# Patient Record
Sex: MALE | Race: ASIAN | NOT HISPANIC OR LATINO | URBAN - METROPOLITAN AREA
[De-identification: names, ages, dates, MRNs, and addresses within clinical notes are randomized per-mention and may not be internally consistent; named-entity substitution may affect disease eponyms.]

---

## 2021-02-12 ENCOUNTER — INPATIENT (INPATIENT)
Facility: HOSPITAL | Age: 21
LOS: 4 days | Discharge: HOME | End: 2021-02-17
Attending: PSYCHIATRY & NEUROLOGY | Admitting: PSYCHIATRY & NEUROLOGY
Payer: COMMERCIAL

## 2021-02-12 VITALS
RESPIRATION RATE: 18 BRPM | SYSTOLIC BLOOD PRESSURE: 143 MMHG | DIASTOLIC BLOOD PRESSURE: 72 MMHG | HEART RATE: 93 BPM | OXYGEN SATURATION: 100 % | TEMPERATURE: 98 F

## 2021-02-12 DIAGNOSIS — F33.2 MAJOR DEPRESSIVE DISORDER, RECURRENT SEVERE WITHOUT PSYCHOTIC FEATURES: ICD-10-CM

## 2021-02-12 DIAGNOSIS — F32.1 MAJOR DEPRESSIVE DISORDER, SINGLE EPISODE, MODERATE: ICD-10-CM

## 2021-02-12 LAB
ALBUMIN SERPL ELPH-MCNC: 4.6 G/DL — SIGNIFICANT CHANGE UP (ref 3.3–5)
ALP SERPL-CCNC: 81 U/L — SIGNIFICANT CHANGE UP (ref 40–120)
ALT FLD-CCNC: 15 U/L — SIGNIFICANT CHANGE UP (ref 4–41)
ANION GAP SERPL CALC-SCNC: 14 MMOL/L — SIGNIFICANT CHANGE UP (ref 7–14)
APPEARANCE UR: CLEAR — SIGNIFICANT CHANGE UP
AST SERPL-CCNC: 23 U/L — SIGNIFICANT CHANGE UP (ref 4–40)
B PERT DNA SPEC QL NAA+PROBE: SIGNIFICANT CHANGE UP
BASOPHILS # BLD AUTO: 0.03 K/UL — SIGNIFICANT CHANGE UP (ref 0–0.2)
BASOPHILS NFR BLD AUTO: 0.4 % — SIGNIFICANT CHANGE UP (ref 0–2)
BILIRUB SERPL-MCNC: 0.5 MG/DL — SIGNIFICANT CHANGE UP (ref 0.2–1.2)
BILIRUB UR-MCNC: NEGATIVE — SIGNIFICANT CHANGE UP
BUN SERPL-MCNC: 11 MG/DL — SIGNIFICANT CHANGE UP (ref 7–23)
C PNEUM DNA SPEC QL NAA+PROBE: SIGNIFICANT CHANGE UP
CALCIUM SERPL-MCNC: 9.8 MG/DL — SIGNIFICANT CHANGE UP (ref 8.4–10.5)
CHLORIDE SERPL-SCNC: 100 MMOL/L — SIGNIFICANT CHANGE UP (ref 98–107)
CO2 SERPL-SCNC: 22 MMOL/L — SIGNIFICANT CHANGE UP (ref 22–31)
COLOR SPEC: YELLOW — SIGNIFICANT CHANGE UP
CREAT SERPL-MCNC: 1.11 MG/DL — SIGNIFICANT CHANGE UP (ref 0.5–1.3)
DIFF PNL FLD: NEGATIVE — SIGNIFICANT CHANGE UP
EOSINOPHIL # BLD AUTO: 0.07 K/UL — SIGNIFICANT CHANGE UP (ref 0–0.5)
EOSINOPHIL NFR BLD AUTO: 0.9 % — SIGNIFICANT CHANGE UP (ref 0–6)
FLUAV SUBTYP SPEC NAA+PROBE: SIGNIFICANT CHANGE UP
FLUBV RNA SPEC QL NAA+PROBE: SIGNIFICANT CHANGE UP
GLUCOSE SERPL-MCNC: 72 MG/DL — SIGNIFICANT CHANGE UP (ref 70–99)
GLUCOSE UR QL: NEGATIVE — SIGNIFICANT CHANGE UP
HADV DNA SPEC QL NAA+PROBE: SIGNIFICANT CHANGE UP
HCOV 229E RNA SPEC QL NAA+PROBE: SIGNIFICANT CHANGE UP
HCOV HKU1 RNA SPEC QL NAA+PROBE: SIGNIFICANT CHANGE UP
HCOV NL63 RNA SPEC QL NAA+PROBE: SIGNIFICANT CHANGE UP
HCOV OC43 RNA SPEC QL NAA+PROBE: SIGNIFICANT CHANGE UP
HCT VFR BLD CALC: 47.7 % — SIGNIFICANT CHANGE UP (ref 39–50)
HGB BLD-MCNC: 15.3 G/DL — SIGNIFICANT CHANGE UP (ref 13–17)
HMPV RNA SPEC QL NAA+PROBE: SIGNIFICANT CHANGE UP
HPIV1 RNA SPEC QL NAA+PROBE: SIGNIFICANT CHANGE UP
HPIV2 RNA SPEC QL NAA+PROBE: SIGNIFICANT CHANGE UP
HPIV3 RNA SPEC QL NAA+PROBE: SIGNIFICANT CHANGE UP
HPIV4 RNA SPEC QL NAA+PROBE: SIGNIFICANT CHANGE UP
IANC: 4.97 K/UL — SIGNIFICANT CHANGE UP (ref 1.5–8.5)
IMM GRANULOCYTES NFR BLD AUTO: 0.4 % — SIGNIFICANT CHANGE UP (ref 0–1.5)
KETONES UR-MCNC: ABNORMAL
LEUKOCYTE ESTERASE UR-ACNC: NEGATIVE — SIGNIFICANT CHANGE UP
LYMPHOCYTES # BLD AUTO: 2.38 K/UL — SIGNIFICANT CHANGE UP (ref 1–3.3)
LYMPHOCYTES # BLD AUTO: 29.8 % — SIGNIFICANT CHANGE UP (ref 13–44)
MCHC RBC-ENTMCNC: 28.2 PG — SIGNIFICANT CHANGE UP (ref 27–34)
MCHC RBC-ENTMCNC: 32.1 GM/DL — SIGNIFICANT CHANGE UP (ref 32–36)
MCV RBC AUTO: 87.8 FL — SIGNIFICANT CHANGE UP (ref 80–100)
MONOCYTES # BLD AUTO: 0.51 K/UL — SIGNIFICANT CHANGE UP (ref 0–0.9)
MONOCYTES NFR BLD AUTO: 6.4 % — SIGNIFICANT CHANGE UP (ref 2–14)
NEUTROPHILS # BLD AUTO: 4.97 K/UL — SIGNIFICANT CHANGE UP (ref 1.8–7.4)
NEUTROPHILS NFR BLD AUTO: 62.1 % — SIGNIFICANT CHANGE UP (ref 43–77)
NITRITE UR-MCNC: NEGATIVE — SIGNIFICANT CHANGE UP
NRBC # BLD: 0 /100 WBCS — SIGNIFICANT CHANGE UP
NRBC # FLD: 0 K/UL — SIGNIFICANT CHANGE UP
PCP SPEC-MCNC: SIGNIFICANT CHANGE UP
PH UR: 6 — SIGNIFICANT CHANGE UP (ref 5–8)
PLATELET # BLD AUTO: 290 K/UL — SIGNIFICANT CHANGE UP (ref 150–400)
POTASSIUM SERPL-MCNC: 4.4 MMOL/L — SIGNIFICANT CHANGE UP (ref 3.5–5.3)
POTASSIUM SERPL-SCNC: 4.4 MMOL/L — SIGNIFICANT CHANGE UP (ref 3.5–5.3)
PROT SERPL-MCNC: 7.4 G/DL — SIGNIFICANT CHANGE UP (ref 6–8.3)
PROT UR-MCNC: ABNORMAL
RAPID RVP RESULT: SIGNIFICANT CHANGE UP
RBC # BLD: 5.43 M/UL — SIGNIFICANT CHANGE UP (ref 4.2–5.8)
RBC # FLD: 12.2 % — SIGNIFICANT CHANGE UP (ref 10.3–14.5)
RSV RNA SPEC QL NAA+PROBE: SIGNIFICANT CHANGE UP
RV+EV RNA SPEC QL NAA+PROBE: SIGNIFICANT CHANGE UP
SARS-COV-2 RNA SPEC QL NAA+PROBE: SIGNIFICANT CHANGE UP
SODIUM SERPL-SCNC: 136 MMOL/L — SIGNIFICANT CHANGE UP (ref 135–145)
SP GR SPEC: 1.02 — SIGNIFICANT CHANGE UP (ref 1.01–1.02)
TOXICOLOGY SCREEN, DRUGS OF ABUSE, SERUM RESULT: SIGNIFICANT CHANGE UP
TSH SERPL-MCNC: 1.67 UIU/ML — SIGNIFICANT CHANGE UP (ref 0.27–4.2)
UROBILINOGEN FLD QL: SIGNIFICANT CHANGE UP
WBC # BLD: 7.99 K/UL — SIGNIFICANT CHANGE UP (ref 3.8–10.5)
WBC # FLD AUTO: 7.99 K/UL — SIGNIFICANT CHANGE UP (ref 3.8–10.5)

## 2021-02-12 PROCEDURE — 99285 EMERGENCY DEPT VISIT HI MDM: CPT

## 2021-02-12 RX ORDER — ESCITALOPRAM OXALATE 10 MG/1
5 TABLET, FILM COATED ORAL DAILY
Refills: 0 | Status: DISCONTINUED | OUTPATIENT
Start: 2021-02-12 | End: 2021-02-17

## 2021-02-12 RX ADMIN — Medication 1 MILLIGRAM(S): at 22:52

## 2021-02-12 NOTE — ED BEHAVIORAL HEALTH NOTE - BEHAVIORAL HEALTH NOTE
Writer called [phone] spoke to pt's father Zhane Joseph.  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  Pt last worked one week ago.  He states he brought pt to the ER because pt was upset.  He states pt gets angry very fast then he wants to do things to himself.  He states in the past (about one year ago) pt used to talk about suicide.  He states today pt said he was fed up with life. He states he never brought patient to the emergency room because he didn't think it was serious.  He states now pt seems to be in an angry mood when he asks pt to go to the grocery store.  He states in the past patient used to run errands with him.  Pt's father doesn't know if patient should go to the hospital at this time, he thinks pt should just get treatment for one night and then go home.  He states patient is having a hard time sleeping, pt is up at 2 and 3 am.  Pt's room is in the attic and states he can hear patient walking around.  He believes pt is eating normally.  He states one month ago pt punched a wall, but nothing dangerous.  Denies pt uses any drugs or alcohol. Writer called [phone] spoke to pt's father Zhane Joseph.  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  Pt last worked one week ago.  He states he brought pt to the ER because pt was upset.  He states pt gets angry very fast then he wants to do things to himself.  He states in the past (about one year ago) pt used to talk about suicide.  He states today pt said he was fed up with life. He states he never brought patient to the emergency room because he didn't think it was serious.  He states now pt seems to be in an angry mood when he asks pt to go to the grocery store, pt is more withdrawn.  He states in the past patient used to run errands with him.  Pt's father doesn't know if patient should go to the hospital at this time, he thinks pt should just get treatment for one night and then go home.  He states patient is having a hard time sleeping, pt is up at 2 and 3 am.  Pt's room is in the attic and states he can hear patient walking around.  He believes pt is eating normally.  He states one month ago pt punched a wall, but nothing dangerous.  Denies pt uses any drugs or alcohol.

## 2021-02-12 NOTE — ED PROVIDER NOTE - OBJECTIVE STATEMENT
21 y/o M BIB family   secondary to depression and  concerns of suicidal   ideations.   Admits to  inability to cope and requesting medication.   Denies HI/AH/VH.  Denies falling, punching or kicking any objects. Denies recent use of alcohol or illicit drugs.  No evidence of physical injuries, broken  skin or deformities.  Denies pain, SOB, fever, chills, chest/abdominal discomfort.

## 2021-02-12 NOTE — ED BEHAVIORAL HEALTH ASSESSMENT NOTE - DESCRIPTION
parents are , lives with father, rest of family is in [place]. Student at [employer] and employed at [employer] in a [employer] denies calm, cooperative  Vital Signs Last 24 Hrs  T(C): 36.8 (12 Feb 2021 13:17), Max: 36.8 (12 Feb 2021 13:17)  T(F): 98.2 (12 Feb 2021 13:17), Max: 98.2 (12 Feb 2021 13:17)  HR: 93 (12 Feb 2021 13:17) (93 - 93)  BP: 143/72 (12 Feb 2021 13:17) (143/72 - 143/72)  BP(mean): --  RR: 18 (12 Feb 2021 13:17) (18 - 18)  SpO2: 100% (12 Feb 2021 13:17) (100% - 100%)

## 2021-02-12 NOTE — ED BEHAVIORAL HEALTH ASSESSMENT NOTE - PSYCHIATRIC ISSUES AND PLAN (INCLUDE STANDING AND PRN MEDICATION)
Puree food was mixed with blue fruit dye for contrast./puree
honey thick/puree/Puree food and honey thick liquids were mixed with blue fruit dye for contrast.
Pt offered mechanical soft solids, pureed foods and honey thick/nectar thick liquids as well as thin liquids.
Will start Lexapro 5mg QAM. Ativan 1mg PO q6 and 2mg IM PRN for anxiety/agitation
Yes

## 2021-02-12 NOTE — ED BEHAVIORAL HEALTH NOTE - BEHAVIORAL HEALTH NOTE
COVID Exposure Screen- Patient     1.        *Have you had a COVID-19 test in the last 21 days?  (  ) Yes   ( x ) No   (  ) Unknown- Reason: ______  IF YES PROCEED TO QUESTION #2. IF NO OR UNKNOWN THEN PLEASE SKIP TO QUESTION #3.  2.        Date of test: ________  3.        3. Do you know the result? (  ) Negative   (  ) Positive   (  ) No result available  4.        *In the past 14 days, have you been around anyone with a positive COVID-19 test?*  (  ) Yes   ( x ) No   (  ) Unknown- Reason (e.g. patient uncertain, sedated, refusing to answer, etc.):  ______  IF YES PROCEED TO QUESTION #5. IF NO or UNKNOWN, PLEASE SKIP TO QUESTION #10  5.        Were you within 6 feet of them for at least 15 minutes? (  ) Yes   (  ) No   (  ) Unknown- Reason: _____  6.        Have you provided care for them? (  ) Yes   (  ) No   (  ) Unknown- Reason: ______  7.        Have you had direct physical contact with them (touched, hugged, or kissed them)? (  ) Yes   (  ) No    (  ) Unknown- Reason: ___  8.        Have you shared eating or drinking utensils with them? (  ) Yes   (  ) No    (  ) Unknown- Reason: ____  9.        Have they sneezed, coughed, or somehow got respiratory droplets on you? (  ) Yes   (  ) No    (  ) Unknown- Reason: ______  10.     *Have you been out of [place] within the past 14 days?*  (  ) Yes   ( x ) No   (  ) Unknown- Reason (e.g. patient uncertain, sedated, refusing to answer, etc.): _______  IF YES PLEASE ANSWER THE FOLLOWING QUESTIONS:  11.     Which state/country have you been to? ______  12.     Were you there over 24 hours? (  ) Yes   (  ) No    (  ) Unknown- Reason: ______  13.     Date of return to [facility]: ______

## 2021-02-12 NOTE — ED ADULT TRIAGE NOTE - CHIEF COMPLAINT QUOTE
Pt co feeling depressed x 2 years not on any medications has had SI thoughts over the past few years but states over last few days those thoughts of SI are worse. Pt states a friend betrayed him causing those thoughts to arise.

## 2021-02-12 NOTE — ED PROVIDER NOTE - IV ALTEPASE ADMIN HIDDEN
Opened chart to review prior to surgery. Checks made to ensure chart/patient is cleared and ready for surgery. show

## 2021-02-12 NOTE — ED PROVIDER NOTE - CLINICAL SUMMARY MEDICAL DECISION MAKING FREE TEXT BOX
Labs, Urine Tox/UA, EKG    Medical evaluation performed. There is no clinical evidence of intoxication or any acute medical problem requiring immediate intervention. Patient is awaiting psychiatric consultation. Final disposition will be determined by psychiatrist. 21 y/o M   Labs, Urine Tox/UA, EKG    Medical evaluation performed. There is no clinical evidence of intoxication or any acute medical problem requiring immediate intervention. Patient is awaiting psychiatric consultation. Final disposition will be determined by psychiatrist.

## 2021-02-12 NOTE — ED BEHAVIORAL HEALTH ASSESSMENT NOTE - RISK ASSESSMENT
Moderate Acute Suicide Risk acute risks include depressive symptoms, passive and active suicidal thinking with vague plans, recent loss of friendship, insomnia, lack of treatment. no chronic risks identified. protective factors include no reported suicidal intent, no prior suicide attempts, no history of physical violence, no known access to weapons, no legal issues, stable domicile, working, in school. Risk is moderately elevated, will mitigate with psychiatric admission, medication management and millieu therapy.

## 2021-02-12 NOTE — ED BEHAVIORAL HEALTH ASSESSMENT NOTE - NSACTIVEVENT_PSY_ALL_CORE
Triggering events leading to humiliation, shame, and/or despair (e.g., Loss of relationship, financial or health status) (real or anticipated)/Recent onset of psychiatric illness

## 2021-02-12 NOTE — ED BEHAVIORAL HEALTH NOTE - BEHAVIORAL HEALTH NOTE
[facility] for precert at [phone]. Writer spoke with representative who reported pt birthday in [employer] system as July 8th 2000. Our system listed as 2000. Writer then transferred to SANAZ VARMA who took clinical information. Patricia reported notice of admission registered for 11 units to [facility] starting 2/12 to 2/23 with reference #[id number]. Concurrent review due 2/22 with contact to be assigned. D/c summary to be called in as well.     Writer contacted pt’s father, Zhane Joseph, at [phone]. Father informed of pt’s pending admission to unit Low 6 at [facility]. Father asked to confirm birthday originally reported 2000 but then changing to 2000 when told of insurance switch. Father did not feel confident with confirmation reports being under stress.

## 2021-02-12 NOTE — ED ADULT NURSE NOTE - OBJECTIVE STATEMENT
Pt arrived to  endorsing S/I with no plan. Pt calm and cooperative, denies H/I A/H v/H. Pt changed into  clothing. Personal property collected and logged.

## 2021-02-12 NOTE — ED BEHAVIORAL HEALTH ASSESSMENT NOTE - SUMMARY
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   Patient endorses symptoms consistent with a major depressive episode. He has been considering suicide via various means though reports no intent. He is oddly related which could stem from cultural differences, though his description of a "restart" in terms of death does not appear to be entirely consistent with his cultural or Jehovah's witness beliefs, which would warrant further exploration for psychotic features. He is requesting admission for safety and medication management and based on this evaluation is appropriate for inpatient level of care. Patient has capacity for voluntary legal status. 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   Patient endorses symptoms consistent with a major depressive episode. He has been considering suicide via various means though reports no intent. He is oddly related which could stem from cultural differences, though his description of a "restart" in terms of death does not appear to be entirely consistent with his cultural or Taoism beliefs, which would warrant further exploration for psychotic features. He is requesting admission for safety and medication management and based on this evaluation is appropriate for inpatient level of care. Patient has capacity for voluntary legal status. Given no intent for self harm or prior self harm, and help seeking behavior without evidence of agitation in the ED, I would not recommend 1:1 in a locked supervised setting.

## 2021-02-12 NOTE — ED BEHAVIORAL HEALTH ASSESSMENT NOTE - HPI (INCLUDE ILLNESS QUALITY, SEVERITY, DURATION, TIMING, CONTEXT, MODIFYING FACTORS, ASSOCIATED SIGNS AND SYMPTOMS)
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   Pt states he has been depressed for the past 2-3 years but has "bottled it up". He states he has always managed by reaching out to friends. He noticed over the past several days it worsened after the end of a friendship with a female, he describes her as his "confidant" but now she is no longer in his life. He states he has contemplated suicide many times but noted the thoughts increasing in intensity since this falling out with his friend. He states he often thinks he will be better off dead, because he might get "a restart" but since he is not sure he will get a restart, he has not gone through with killing himself. He states "its easy" when asked about a suicide method, stating he could cut wrists, jump off a building, saying "I watch tv, I know how to do it" but denies coming up with a concrete plan, denies researching methods of suicide, denies procuring objects to harm himself with. Denies access to a gun.  He states he "feels nothing" and is "empty". He is not motivated or interested in school, saying virtual school has been challenging for him and as a result he is currently on academic probation. He denies concentration difficulties. He states his sleep is chronically poor (he often works the night shift but occasionally takes an evening shift) and he is tired. He has difficulty falling asleep even after working an overnight shift. He reports no change in appetite. He feels guilt for this issue with his relationship. Has also had self injurious thoughts but denies ever engaging in behaviors. He denies homicidal ideation. He denies paranoia, denies hallucinations, denies IOR. Denies ever experiencing manic symptoms. He reports anxiety and tenseness but denies panic attacks. He states in the past he experimented with hallucinogens (shrooms and lsd) after researching its potential therapeutic benefits on depression. He states he smokes weed occasionally and drinks socially. He states he likes being drunk but rarely gets to that point, last drink was last night, denies blackouts/withdrawal. He denies ever being physically violent but states he punched a wall once. He is requesting admission.    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   Pt states he has been depressed for the past 2-3 years but has "bottled it up". He states he has always managed by reaching out to friends. He noticed over the past several days it worsened after the end of a friendship with a female, he describes her as his "confidant" but now she is no longer in his life. He states he has contemplated suicide many times but noted the thoughts increasing in intensity since this falling out with his friend. He states he often thinks he will be better off dead, because he might get "a restart", but since he is not sure he will get a restart, he has not gone through with killing himself. He states "it's easy" when asked about a suicide method, stating he could cut wrists, jump off a building, saying "I watch tv, I know how to do it" but denies coming up with a concrete plan, denies researching methods of suicide, denies procuring objects to harm himself with or taking other preparatory actions. Denies access to a gun.  He states he "feels nothing" and is "empty". He is not motivated or interested in school, saying virtual school has been challenging for him and as a result he is currently on academic probation. He denies concentration difficulties. He states his sleep is chronically poor (he often works the night shift but occasionally takes an evening shift) and he is tired. He has difficulty falling asleep even after working an overnight shift. He reports no change in appetite. He feels guilt for this issue with his relationship. Has also had self injurious thoughts but denies ever engaging in behaviors. He denies homicidal ideation. He denies paranoia, denies hallucinations, denies IOR. Denies ever experiencing manic symptoms. He reports anxiety and tenseness but denies panic attacks. He states in the past he experimented with hallucinogens (shrooms and lsd) after researching its potential therapeutic benefits on depression. He states he smokes weed occasionally and drinks socially. He states he likes being drunk but rarely gets to that point, last drink was last night, denies blackouts/withdrawal. He denies ever being physically violent but states he punched a wall once. He is requesting admission.    See  note for information from father

## 2021-02-12 NOTE — ED BEHAVIORAL HEALTH ASSESSMENT NOTE - DETAILS
self referred, father informed passive and active SI with multiple vague plans but no intent punched a wall once MARTI

## 2021-02-13 DIAGNOSIS — F34.1 DYSTHYMIC DISORDER: ICD-10-CM

## 2021-02-13 PROCEDURE — 99222 1ST HOSP IP/OBS MODERATE 55: CPT

## 2021-02-13 RX ORDER — MIRTAZAPINE 45 MG/1
7.5 TABLET, ORALLY DISINTEGRATING ORAL AT BEDTIME
Refills: 0 | Status: DISCONTINUED | OUTPATIENT
Start: 2021-02-13 | End: 2021-02-17

## 2021-02-13 RX ORDER — ACETAMINOPHEN 500 MG
650 TABLET ORAL EVERY 6 HOURS
Refills: 0 | Status: DISCONTINUED | OUTPATIENT
Start: 2021-02-13 | End: 2021-02-17

## 2021-02-13 RX ADMIN — ESCITALOPRAM OXALATE 5 MILLIGRAM(S): 10 TABLET, FILM COATED ORAL at 09:40

## 2021-02-13 RX ADMIN — MIRTAZAPINE 7.5 MILLIGRAM(S): 45 TABLET, ORALLY DISINTEGRATING ORAL at 20:54

## 2021-02-13 NOTE — BH INPATIENT PSYCHIATRY ASSESSMENT NOTE - NSSUICPROTFACT_PSY_ALL_CORE
Responsibility to children, family, or others/Identifies reasons for living/Fear of death or the actual act of killing self/Cultural, spiritual and/or moral attitudes against suicide/Engaged in work or school

## 2021-02-13 NOTE — BH INPATIENT PSYCHIATRY ASSESSMENT NOTE - HPI (INCLUDE ILLNESS QUALITY, SEVERITY, DURATION, TIMING, CONTEXT, MODIFYING FACTORS, ASSOCIATED SIGNS AND SYMPTOMS)
Patient seen by me at length in conference room for initial inpatient interview.  I have reviewed the admission record and reviewed the patient's physical examination, review of systems and labs. I have discussed the case with the treatment team.       Patient states he has been depressed for years and only felt "normal" after using "Magic mushrooms".  Has been increasingly depressed but recently had a close female friend who ended their friendship and "betrayed" im though he would not elaborate. 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   Pt states he has been depressed for the past 2-3 years but has "bottled it up". He states he has always managed by reaching out to friends. He noticed over the past several days it worsened after the end of a friendship with a female, he describes her as his "confidant" but now she is no longer in his life. He states he has contemplated suicide many times but noted the thoughts increasing in intensity since this falling out with his friend. He states he often thinks he will be better off dead, because he might get "a restart", but since he is not sure he will get a restart, he has not gone through with killing himself. He states "it's easy" when asked about a suicide method, stating he could cut wrists, jump off a building, saying "I watch tv, I know how to do it" but denies coming up with a concrete plan, denies researching methods of suicide, denies procuring objects to harm himself with or taking other preparatory actions. Denies access to a gun.  He states he "feels nothing" and is "empty". He is not motivated or interested in school, saying virtual school has been challenging for him and as a result he is currently on academic probation. He denies concentration difficulties. He states his sleep is chronically poor (he often works the night shift but occasionally takes an evening shift) and he is tired. He has difficulty falling asleep even after working an overnight shift. He reports no change in appetite. He feels guilt for this issue with his relationship. Has also had self injurious thoughts but denies ever engaging in behaviors. He denies homicidal ideation. He denies paranoia, denies hallucinations, denies IOR. Denies ever experiencing manic symptoms. He reports anxiety and tenseness but denies panic attacks. He states in the past he experimented with hallucinogens (shrooms and lsd) after researching its potential therapeutic benefits on depression. He states he smokes weed occasionally and drinks socially. He states he likes being drunk but rarely gets to that point, last drink was last night, denies blackouts/withdrawal. He denies ever being physically violent but states he punched a wall once. He is requesting admission.    See  note for information from father

## 2021-02-13 NOTE — BH INPATIENT PSYCHIATRY ASSESSMENT NOTE - CURRENT MEDICATION
MEDICATIONS  (STANDING):  escitalopram 5 milliGRAM(s) Oral daily    MEDICATIONS  (PRN):  LORazepam     Tablet 1 milliGRAM(s) Oral every 6 hours PRN anxiety  LORazepam     Tablet 2 milliGRAM(s) Oral every 2 hours PRN CIWA score increase by 2 points and current CIWA score GREATER THAN 9  LORazepam   Injectable 2 milliGRAM(s) IntraMuscular once PRN combativeness due to depression

## 2021-02-13 NOTE — BH INPATIENT PSYCHIATRY ASSESSMENT NOTE - MSE UNSTRUCTURED FT
On initial MSE today the patient is casually dressed and makes fair eye contact. Speech is clear and of normal rate. Thought process is linear and goal directed with no disorder of thought process. Thought content with no evidence of delusions, obsessions, ruminations or phobias. Denies hallucinations.  Mood is described as "depressed" affect constricted. Patient with helplessness and some hopelessness and passive SI but with no hx of NSSIB and denies suicidal ideation, intent and plan. Pt denies homicidal and aggressive ideation, intent and plan.  Patient is  Alert and oriented X3. Cognitively grossly intact. Insight and judgment are limited. Impulse control is intact at this time.

## 2021-02-13 NOTE — BH INPATIENT PSYCHIATRY ASSESSMENT NOTE - RISK ASSESSMENT
acute risks include depressive symptoms, passive and active suicidal thinking with vague plans, recent loss of friendship, insomnia, lack of treatment. no chronic risks identified. protective factors include no reported suicidal intent, no prior suicide attempts, no history of physical violence, no known access to weapons, no legal issues, stable domicile, working, in school. Risk is moderately elevated, will mitigate with psychiatric admission, medication management and millieu therapy.

## 2021-02-13 NOTE — BH INPATIENT PSYCHIATRY ASSESSMENT NOTE - NSBHCHARTREVIEWVS_PSY_A_CORE FT
Vital Signs Last 24 Hrs  T(C): 36.3 (13 Feb 2021 06:33), Max: 36.8 (12 Feb 2021 13:17)  T(F): 97.3 (13 Feb 2021 06:33), Max: 98.2 (12 Feb 2021 13:17)  HR: 78 (13 Feb 2021 06:33) (67 - 93)  BP: 132/78 (13 Feb 2021 06:33) (127/80 - 143/72)  BP(mean): --  RR: 18 (13 Feb 2021 06:33) (16 - 18)  SpO2: 100% (12 Feb 2021 17:15) (100% - 100%)

## 2021-02-13 NOTE — BH INPATIENT PSYCHIATRY ASSESSMENT NOTE - NSCORESITESY/N_GEN_A_CORE_RD
Internal Medicine Progress Note    Today's Date:  2019   Patient:  Lizeth Guerra  Patient :  1950    Subjective:     Chief Complaint   Patient presents with    Annual Wellness Visit    Hypertension    Weight Management    Cholesterol Problem    Other     DMV paperwork      Hypertension   This is a chronic problem. BP is at goal. Pt takes lopressor, norvasc and candesartan. Pt reports compliance with these medications. Atrial fibrillation  This is a chronic problem. This is at goal. Pt is on coumadin and a beta blocker. Pt was taken off amiodarone. Pt is seeing Dr. Sammuel Sacks and Dr. Jemma Yanes. Pt is s/p cardiac ablation and cardioversion. Obesity Class III/Hyperlipidemia  This is a chronic problem. This is not at goal. Pt was on the ketogenic/IF diet. She lost weight since the last visit. Lab Results   Component Value Date/Time    Cholesterol, total 211 (H) 2019 10:12 AM    HDL Cholesterol 46 2019 10:12 AM    LDL, calculated 120 (H) 2019 10:12 AM    VLDL, calculated 45 2019 10:12 AM    Triglyceride 225 (H) 2019 10:12 AM    CHOL/HDL Ratio 4.6 2019 10:12 AM     3 most recent PHQ Screens 2019   PHQ Not Done -   Little interest or pleasure in doing things Not at all   Feeling down, depressed, irritable, or hopeless Not at all   Total Score PHQ 2 0     Past Medical History:   Diagnosis Date    Acute bilateral low back pain without sciatica 2018    Advance directive discussed with patient 2016    Pt would like to appoint her son, Annabella Barber as her medical decision maker in the event that she can no longer do so. Phone number is [phone]. Her secondary person is her niece, Sandra Saavedra. Phone number is [phone]. If her death is imminent and medical treatment will not help her recover, pt does not want life prolonging measures.   If her condition makes her unaware of h    Dizziness 5/3/2016    Hypertriglyceridemia 9/6/2016    Mixed hyperlipidemia 8/19/2019    Obesity, Class III, BMI 40-49.9 (morbid obesity) ([facility] [address] 6/29/2015    Osteoarthritis     Shingles 9/29/2015     History reviewed. No pertinent surgical history. reports that she has never smoked. She has never used smokeless tobacco. She reports that she does not drink alcohol or use drugs. Family History   Problem Relation Age of Onset    Heart Disease Father         unknown, ? MI at age 46    Arthritis-osteo Sister      No Known Allergies     Review of Systems   CV:      chest pain, palpitations  PULM:  SOB, wheezing, cough, sputum production    Current Outpatient Meds and Allergies     Current Outpatient Medications on File Prior to Visit   Medication Sig Dispense Refill    metoprolol tartrate (LOPRESSOR) 100 mg IR tablet Take  by mouth two (2) times a day.  warfarin (COUMADIN) 2.5 mg tablet Take 1 Tab by mouth daily. 90 Tab 3    acetaminophen (TYLENOL) 650 mg TbER Take 2 Tabs by mouth every eight (8) hours. 90 Tab 3    pravastatin (PRAVACHOL) 40 mg tablet Take 1 Tab by mouth nightly. 90 Tab 3    candesartan (ATACAND) 32 mg tablet Take 1 Tab by mouth daily. 90 Tab 3    amLODIPine (NORVASC) 5 mg tablet Take 1 Tab by mouth daily. 90 Tab 3    cyclobenzaprine (FLEXERIL) 10 mg tablet Take 1 Tab by mouth three (3) times daily as needed for Muscle Spasm(s). 30 Tab 0     No current facility-administered medications on file prior to visit.       Objective:       Visit Vitals  /80 (BP 1 Location: Left arm, BP Patient Position: Sitting)   Pulse 70   Temp 98 °F (36.7 °C) (Oral)   Resp 16   Ht 5' 1\" (1.549 m)   Wt 257 lb (116.6 kg)   SpO2 98%   BMI 48.56 kg/m²     General:   Well-nourished, well-groomed, pleasant, alert, in no acute distress  Head:  Normocephalic, atraumatic  Ears:  External ears WNL  Nose:  External nares WNL  Psych:  No pressured speech, no abnormal thought content    Office Visit on 08/19/2019   Component Date Value Ref Range Status    Mammography, External 07/12/2019 negative    Final   Hospital Outpatient Visit on 05/28/2019   Component Date Value Ref Range Status    LIPID PROFILE 05/28/2019        Final    Cholesterol, total 05/28/2019 211* <200 MG/DL Final    Triglyceride 05/28/2019 225* <150 MG/DL Final    Comment: The drugs N-acetylcysteine (NAC) and  Metamiszole have been found to cause falsely  low results in this chemical assay. Please  be sure to submit blood samples obtained  BEFORE administration of either of these  drugs to assure correct results.  HDL Cholesterol 05/28/2019 46  40 - 60 MG/DL Final    LDL, calculated 05/28/2019 120* 0 - 100 MG/DL Final    VLDL, calculated 05/28/2019 45  MG/DL Final    CHOL/HDL Ratio 05/28/2019 4.6  0 - 5.0   Final    Hemoglobin A1c 05/28/2019 5.5  4.2 - 5.6 % Final    Comment: (NOTE)  HbA1C Interpretive Ranges  <5.7              Normal  5.7 - 6.4         Consider Prediabetes  >6.5              Consider Diabetes      Est. average glucose 05/28/2019 111  mg/dL Final    Comment: (NOTE)  The eAG should be interpreted with patient characteristics in mind   since ethnicity, interindividual differences, red cell lifespan,   variation in rates of glycation, etc. may affect the validity of the   calculation.       Sodium 05/28/2019 140  136 - 145 mmol/L Final    Potassium 05/28/2019 4.2  3.5 - 5.5 mmol/L Final    Chloride 05/28/2019 105  100 - 108 mmol/L Final    CO2 05/28/2019 28  21 - 32 mmol/L Final    Anion gap 05/28/2019 7  3.0 - 18 mmol/L Final    Glucose 05/28/2019 88  74 - 99 mg/dL Final    BUN 05/28/2019 14  7.0 - 18 MG/DL Final    Creatinine 05/28/2019 0.65  0.6 - 1.3 MG/DL Final    BUN/Creatinine ratio 05/28/2019 22* 12 - 20   Final    GFR est AA 05/28/2019 >60  >60 ml/min/1.73m2 Final    GFR est non-AA 05/28/2019 >60  >60 ml/min/1.73m2 Final    Comment: (NOTE)  Estimated GFR is calculated using the Modification of Diet in Renal   Disease (MDRD) Study equation, reported for both  Americans   (GFRAA) and non- Americans (GFRNA), and normalized to 1.73m2   body surface area. The physician must decide which value applies to   the patient. The MDRD study equation should only be used in   individuals age 25 or older. It has not been validated for the   following: pregnant women, patients with serious comorbid conditions,   or on certain medications, or persons with extremes of body size,   muscle mass, or nutritional status.  Calcium 05/28/2019 8.6  8.5 - 10.1 MG/DL Final     Assessment/Plan & Orders:         ICD-10-CM ICD-9-CM    1. Medicare annual wellness visit, subsequent Z00.00 V70.0    2. Essential hypertension B87 419.5 METABOLIC PANEL, COMPREHENSIVE      LIPID PANEL   3. Mixed hyperlipidemia E78.2 272.2    4. Obesity, Class III, BMI 40-49.9 (morbid obesity) ([facility]) E66.01 278.01    5. Vitamin D deficiency E55.9 268.9 VITAMIN D, 25 HYDROXY   6. Elevated blood sugar R73.9 790.29 HEMOGLOBIN A1C WITH EAG      Diet with exercise  Follow up with cardiology and orthopedics    Follow-up and Dispositions    · Return in about 6 months (around 2/19/2020) for Hypertension, Weight management, Hyperlipidemia, Go over lab/imaging results. *Patient verbalized understanding and agreement with the plan. Patient was given an after-visit summary. Lanie Leyva.  [address], MD - Internal Medicine  8/19/2019, 9:38 AM  [facility]  [address] [facility] [place], [address]  Phone [phone]  Fax [phone] Yes

## 2021-02-13 NOTE — BH INPATIENT PSYCHIATRY ASSESSMENT NOTE - NSCOMMENTSUICRISKFACT_PSY_ALL_CORE
Patient with depressed mood, substance misuse and recent stressors including loss of friendship and academic stress but denies adctive SI and brought self to hospital for help

## 2021-02-13 NOTE — BH INPATIENT PSYCHIATRY ASSESSMENT NOTE - DESCRIPTION
parents are , lives with father, rest of family is in [place]. Student at [employer] and employed at [employer] in a [employer]

## 2021-02-14 PROCEDURE — 99231 SBSQ HOSP IP/OBS SF/LOW 25: CPT

## 2021-02-14 RX ADMIN — MIRTAZAPINE 7.5 MILLIGRAM(S): 45 TABLET, ORALLY DISINTEGRATING ORAL at 20:25

## 2021-02-14 RX ADMIN — ESCITALOPRAM OXALATE 5 MILLIGRAM(S): 10 TABLET, FILM COATED ORAL at 09:45

## 2021-02-14 NOTE — PSYCHIATRIC REHAB INITIAL EVALUATION - NSBHPRRECOMMEND_PSY_ALL_CORE
Writer met with pt to orient them to the unit and the psych rehab department. Pt was able to tolerate session with writer and was pleasant and cooperative. Pt reported he brought himself to the hospital after experiencing an increase in depressive symptoms in the context of a friendship ending. Pt reported that he has been friends with this person for a long time and that he has always been there for her through her depression and bipolar disorder. Pt stated that he has always liked this friend and things became more intimate recently. Pt reported that there was a misunderstanding over text and his friend suggested they no longer communicate. Pt reported that this in addition to the stress of work made him depressed. Pt endorsed insomnia and stated that prior to admission to [facility] he has had a lot of trouble falling asleep. Pt explained that he attends college and is interested [place] transfer to the college track unit 1S. Pt stated that since being admitted he is feeling much better. Pt stated that the antidepressant medications are working and the sleeping aide has helped him sleep more than he has in the a long time. Pt reported that coming to the hospital has allowed him to put things in perspective. Pt reported that while it is still upsetting he lost this friendship he feels that he needs to focus more on himself and not rely on this one friend for support. Pt stated that he has a very close relationship with his mother and father who are  and that he talks with them regularly. Pt reported that he lives with his father. Pt stated that he attends college and that it has been difficult having the classes virtually because of COVID. Pt stated that he enjoys school and wishes to continue. Pt reported that he works at [employer] and that he enjoys his job because it is very physical and is good exercise. Pt stated that he is no longer going to work overnights when that shift is offered. Pt stated that prior to the admission he worked three overnights followed by an evening shift which he now identifies as his limit. 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 Pt reported hx of cannabis, ETOH, LSD, and mushroom use. Pt stated that he only used cannabis with the one friend that he now is no longer speaking with. Pt stated that he enjoys alcohol but doesn't drink often. Pt stated that he hasn't used LSD and mushrooms for a few years. Pt reported that he was previously depressed in high-school and using mushrooms helped cure him. Pt stated that he would rather take an antidepressant than use mushrooms. Pt reported that he will look into this further when he leaves the hospital. Pt denied SI/HI, manic symptoms, AH/VH, and poor appetite. Pt's ADLs are appropriate.

## 2021-02-14 NOTE — PSYCHIATRIC REHAB INITIAL EVALUATION - NSBHPRTOOLBOX_PSY_ALL_CORE
Pt reported playing chess as a coping skill. Pt stated that he enjoys playing chess and plays on the computer at times and would prefer to play with someone in person./Entertainment/Exercise/Family support/Music/Other

## 2021-02-14 NOTE — PSYCHIATRIC REHAB INITIAL EVALUATION - NSBHEDUCURGRADE_PSY_ALL_CORE
Pt reported that he is currently in college and enjoys it. Pt reported that all his classes have been virtual which has been challenging and he has been very stressed due to working overnights at his job. Pt stated that he wishes to continue school and will take less overnight shifts at work so he is not as stressed./4 year college

## 2021-02-14 NOTE — BH SAFETY PLAN - INTERNAL COPING STRATEGY 1
looking at my hospital bracelet to put things in perspective Looking at my hospital bracelet to put things in perspective

## 2021-02-15 PROCEDURE — 99231 SBSQ HOSP IP/OBS SF/LOW 25: CPT

## 2021-02-15 RX ADMIN — Medication 1 MILLIGRAM(S): at 21:17

## 2021-02-15 RX ADMIN — MIRTAZAPINE 7.5 MILLIGRAM(S): 45 TABLET, ORALLY DISINTEGRATING ORAL at 20:47

## 2021-02-15 RX ADMIN — ESCITALOPRAM OXALATE 5 MILLIGRAM(S): 10 TABLET, FILM COATED ORAL at 08:23

## 2021-02-16 PROCEDURE — 99231 SBSQ HOSP IP/OBS SF/LOW 25: CPT

## 2021-02-16 RX ADMIN — ESCITALOPRAM OXALATE 5 MILLIGRAM(S): 10 TABLET, FILM COATED ORAL at 08:22

## 2021-02-16 RX ADMIN — MIRTAZAPINE 7.5 MILLIGRAM(S): 45 TABLET, ORALLY DISINTEGRATING ORAL at 20:33

## 2021-02-16 NOTE — BH INPATIENT PSYCHIATRY PROGRESS NOTE - NSBHATTENDATTEST_PSY_ALL_CORE
I have personally seen, examined and participated in the care of this patient. I have reviewed all pertinent clinical information, including history, physical exam, plan and the Medical/PA/NP Student’s note and agree except as noted.

## 2021-02-17 VITALS — TEMPERATURE: 99 F

## 2021-02-17 PROBLEM — Z78.9 OTHER SPECIFIED HEALTH STATUS: Chronic | Status: ACTIVE | Noted: 2021-02-12

## 2021-02-17 LAB
SARS-COV-2 IGG SERPL IA-ACNC: 0.4 RATIO — SIGNIFICANT CHANGE UP
SARS-COV-2 IGG SERPL QL IA: NEGATIVE — SIGNIFICANT CHANGE UP
SARS-COV-2 IGG SERPL QL IA: NEGATIVE — SIGNIFICANT CHANGE UP
SARS-COV-2 IGM SERPL IA-ACNC: 0.78 RATIO — SIGNIFICANT CHANGE UP

## 2021-02-17 PROCEDURE — 99238 HOSP IP/OBS DSCHRG MGMT 30/<: CPT

## 2021-02-17 RX ORDER — MIRTAZAPINE 45 MG/1
1 TABLET, ORALLY DISINTEGRATING ORAL
Qty: 30 | Refills: 0
Start: 2021-02-17 | End: 2021-03-18

## 2021-02-17 RX ORDER — ESCITALOPRAM OXALATE 10 MG/1
1 TABLET, FILM COATED ORAL
Qty: 30 | Refills: 0
Start: 2021-02-17 | End: 2021-03-18

## 2021-02-17 RX ADMIN — ESCITALOPRAM OXALATE 5 MILLIGRAM(S): 10 TABLET, FILM COATED ORAL at 08:13

## 2021-02-17 NOTE — BH INPATIENT PSYCHIATRY PROGRESS NOTE - CURRENT MEDICATION
MEDICATIONS  (STANDING):  escitalopram 5 milliGRAM(s) Oral daily  mirtazapine 7.5 milliGRAM(s) Oral at bedtime    MEDICATIONS  (PRN):  acetaminophen   Tablet .. 650 milliGRAM(s) Oral every 6 hours PRN Moderate Pain (4 - 6), Severe Pain (7 - 10)  LORazepam     Tablet 1 milliGRAM(s) Oral every 6 hours PRN anxiety  LORazepam     Tablet 2 milliGRAM(s) Oral every 2 hours PRN CIWA score increase by 2 points and current CIWA score GREATER THAN 9  LORazepam   Injectable 2 milliGRAM(s) IntraMuscular once PRN combativeness due to depression  
MEDICATIONS  (STANDING):  escitalopram 5 milliGRAM(s) Oral daily  mirtazapine 7.5 milliGRAM(s) Oral at bedtime    MEDICATIONS  (PRN):  acetaminophen   Tablet .. 650 milliGRAM(s) Oral every 6 hours PRN Moderate Pain (4 - 6), Severe Pain (7 - 10)  LORazepam     Tablet 2 milliGRAM(s) Oral every 2 hours PRN CIWA score increase by 2 points and current CIWA score GREATER THAN 9  LORazepam     Tablet 1 milliGRAM(s) Oral every 6 hours PRN anxiety  LORazepam   Injectable 2 milliGRAM(s) IntraMuscular once PRN combativeness due to depression  
MEDICATIONS  (STANDING):  escitalopram 5 milliGRAM(s) Oral daily  mirtazapine 7.5 milliGRAM(s) Oral at bedtime    MEDICATIONS  (PRN):  acetaminophen   Tablet .. 650 milliGRAM(s) Oral every 6 hours PRN Moderate Pain (4 - 6), Severe Pain (7 - 10)  LORazepam     Tablet 1 milliGRAM(s) Oral every 6 hours PRN anxiety  LORazepam     Tablet 2 milliGRAM(s) Oral every 2 hours PRN CIWA score increase by 2 points and current CIWA score GREATER THAN 9  LORazepam   Injectable 2 milliGRAM(s) IntraMuscular once PRN combativeness due to depression  
MEDICATIONS  (STANDING):  escitalopram 5 milliGRAM(s) Oral daily  mirtazapine 7.5 milliGRAM(s) Oral at bedtime    MEDICATIONS  (PRN):  acetaminophen   Tablet .. 650 milliGRAM(s) Oral every 6 hours PRN Moderate Pain (4 - 6), Severe Pain (7 - 10)  LORazepam     Tablet 1 milliGRAM(s) Oral every 6 hours PRN anxiety  LORazepam     Tablet 2 milliGRAM(s) Oral every 2 hours PRN CIWA score increase by 2 points and current CIWA score GREATER THAN 9  LORazepam   Injectable 2 milliGRAM(s) IntraMuscular once PRN combativeness due to depression

## 2021-02-17 NOTE — BH INPATIENT PSYCHIATRY PROGRESS NOTE - NSBHCONSULTIPREASON_PSY_A_CORE
danger to self; mental illness expected to respond to inpatient care
other reason
danger to self; mental illness expected to respond to inpatient care
danger to self; mental illness expected to respond to inpatient care

## 2021-02-17 NOTE — BH INPATIENT PSYCHIATRY PROGRESS NOTE - MSE UNSTRUCTURED FT
On exam today the patient is casually dressed and makes fair eye contact.  Speech is clear and of normal rate.   Thought process is linear and goal directed with no disorder of thought process. Thought content with no evidence of delusions, obsessions, ruminations or phobias. Denies hallucinations.    Mood is described as "less depressed" affect constricted.   Patient denies hopelessness and passive SI  and denies suicidal ideation, intent and plan.   Pt denies homicidal and aggressive ideation, intent and plan.  Patient is  Alert and oriented X3. Cognitively grossly intact. Insight and judgment are limited. Impulse control is intact at this time.  
Dressed appropriately.  Calm, withdrawn, psychomotor slowed.  Speech regular rate, monotone.  Mood is "a little better," affect depressed but reactive.  Linear TP.  Fair associations.  TC: Denies AVH, SIIP, HIIP today.  Insight limited, judgment limited, attnetion fair, language fluent, gait intact.  
Dressed appropriately.  Fairly related, good eye contact.  Speech regular rate.  Mood is "ok," affect neutral.  Linear TP, fair associations.  TC: Denies SIIP or HIIP.  Insight fair, judgment fair on interview, language fluent, gait intact.  
 On initial MSE today the patient is casually dressed and makes fair eye contact.  Speech is clear and of normal rate.   Thought process is linear and goal directed with no disorder of thought process. Thought content with no evidence of delusions, obsessions, ruminations or phobias. Denies hallucinations.    Mood is described as "depressed" affect constricted.   Patient with helplessness but today denies hopelessness and passive SI  and denies suicidal ideation, intent and plan.   Pt denies homicidal and aggressive ideation, intent and plan.  Patient is  Alert and oriented X3. Cognitively grossly intact. Insight and judgment are limited. Impulse control is intact at this time.

## 2021-02-17 NOTE — BH DISCHARGE NOTE NURSING/SOCIAL WORK/PSYCH REHAB - NSCDUDCCRISIS_PSY_A_CORE
[facility] Well  1 (711) [facility]-WELL ([phone])  Text "WELL" to 50801  Website: www.[place]/.[employer] 1 (597) 521-LAIP (4467) Website: www.safehorizon.org/.National Suicide Prevention Lifeline 8 (274) 506-1520/.  [facility]  1 (975) LIFENET (470-1944)/.  [facility]  [address] [place] [address] [phone]   Hours:  Monday through Friday from 9 AM to 3 PM/.  [place] [employer] of  Affairs - Veterans Crisis Line  1 [phone] [phone], Option 1

## 2021-02-17 NOTE — BH INPATIENT PSYCHIATRY PROGRESS NOTE - NSCGIIMPROVESX_PSY_ALL_CORE
2 = Much improved - notably better with signficant reduction of symptoms; increase in the level of functioning but some symptoms remain

## 2021-02-17 NOTE — BH DISCHARGE NOTE NURSING/SOCIAL WORK/PSYCH REHAB - PATIENT PORTAL LINK FT
You can access the FollowMyHealth Patient Portal offered by [facility] by registering at the following website: [email or web address]. By joining [employer]’s FollowMyHealth portal, you will also be able to view your health information using other applications (apps) compatible with our system.

## 2021-02-17 NOTE — BH INPATIENT PSYCHIATRY DISCHARGE NOTE - OTHER PAST PSYCHIATRIC HISTORY (INCLUDE DETAILS REGARDING ONSET, COURSE OF ILLNESS, INPATIENT/OUTPATIENT TREATMENT)
no admissions, no medication trials, no SA or SIB no admissions, no medication trials.  chronic intermittent SI

## 2021-02-17 NOTE — BH INPATIENT PSYCHIATRY PROGRESS NOTE - NSCGISEVERILLNESS_PSY_ALL_CORE
6 = Severely ill - disruptive pathology, behavior and function are frequently influenced by symptoms, may require assistance from others
4 = Moderately ill – overt symptoms causing noticeable, but modest, functional impairment or distress; symptom level may warrant medication
6 = Severely ill - disruptive pathology, behavior and function are frequently influenced by symptoms, may require assistance from others
6 = Severely ill - disruptive pathology, behavior and function are frequently influenced by symptoms, may require assistance from others

## 2021-02-17 NOTE — BH INPATIENT PSYCHIATRY DISCHARGE NOTE - NSDCCPCAREPLAN_GEN_ALL_CORE_FT
PRINCIPAL DISCHARGE DIAGNOSIS  Diagnosis: Major depressive disorder, single episode, moderate  Assessment and Plan of Treatment:       SECONDARY DISCHARGE DIAGNOSES  Diagnosis: Persistent depressive disorder  Assessment and Plan of Treatment:

## 2021-02-17 NOTE — BH INPATIENT PSYCHIATRY PROGRESS NOTE - NSDCCRITERIA_PSY_ALL_CORE
patient will be less depressed and stable for discharge

## 2021-02-17 NOTE — BH INPATIENT PSYCHIATRY DISCHARGE NOTE - NSBHSUICIDESTATUS_PSY_ALL_CORE
Pt has chronic risk factors of cannabis/etoh use in past, chronic suicidality as maladaptive coping response, with acute risk factors of recent depression in context of falling out with friend, now treated with inpatient care.  Protective factors of stable housing, supportive family, is employed, in school, no access to firearms, medically healthy.  Pt is high CHRONIC risk of harm, but is NO longer an acute or imminent risk of harm to self or others, can be discharged with outpatient follow up.

## 2021-02-17 NOTE — BH INPATIENT PSYCHIATRY DISCHARGE NOTE - NSDCMRMEDTOKEN_GEN_ALL_CORE_FT
escitalopram 5 mg oral tablet: 1 tab(s) orally once a day  mirtazapine 7.5 mg oral tablet: 1 tab(s) orally once a day (at bedtime)

## 2021-02-17 NOTE — BH INPATIENT PSYCHIATRY PROGRESS NOTE - NSICDXBHSECONDARYDX_PSY_ALL_CORE
Persistent depressive disorder   F34.1  

## 2021-02-17 NOTE — BH INPATIENT PSYCHIATRY PROGRESS NOTE - NSBHINPTBILLING_PSY_ALL_CORE
47276 - Hospital Discharge Day Management; 30 min or less
16076 - Inpatient Low Complexity
05901 - Inpatient Low Complexity
33359 - Inpatient Low Complexity

## 2021-02-17 NOTE — BH INPATIENT PSYCHIATRY PROGRESS NOTE - NSTREATMENTCERTY_PSY_ALL_CORE

## 2021-02-17 NOTE — BH DISCHARGE NOTE NURSING/SOCIAL WORK/PSYCH REHAB - NSBHDCADDR1FT_A_CORE
*Please be advised this is telehealth appointment, you will be contacted by the provider on the date and time indicated

## 2021-02-17 NOTE — BH INPATIENT PSYCHIATRY PROGRESS NOTE - NSBHMETABOLIC_PSY_ALL_CORE_FT
BMI:   HbA1c:   Glucose:   BP: 124/71 (02-14-21 @ 08:09) (124/71 - 143/72)  Lipid Panel: 
BMI:   HbA1c:   Glucose:   BP: 124/71 (02-14-21 @ 08:09) (124/71 - 124/71)  Lipid Panel: 
BMI:   HbA1c:   Glucose:   BP: --  Lipid Panel: 
BMI:   HbA1c:   Glucose:   BP: 124/71 (02-14-21 @ 08:09) (124/71 - 132/78)  Lipid Panel:

## 2021-02-17 NOTE — BH INPATIENT PSYCHIATRY DISCHARGE NOTE - NSDCADMDATE_GEN_ALL_CORE_FT
From: Kesha Babin  To: Khoa Bowens MD  Sent: 4/24/2020 3:51 PM CDT  Subject: Non-Urgent Medical Question    Hello,    I am a little concern that something else is going on. My foot has started to get hot again with tingling. The tingling goes up my leg. I have also had a bad headache for two days. I took Tylenol and it goes away for a bit but comes back.  
I'd like for her to have an in-person appt to further evaluate her foot  
Sent message to patient.  Awaiting response.  
Spoke with patient on the phone.  COVID questions asked--all negative.  Informed patient to enter at the [place] entrance (facing [place]).  Appt scheduled.  
12-Feb-2021 16:48

## 2021-02-17 NOTE — BH INPATIENT PSYCHIATRY PROGRESS NOTE - NSBHFUPINTERVALHXFT_PSY_A_CORE
pt seen for follow up of depression.    Reports that he is sleeping better for the first time in weeks.  Denies hopelessness and SI  Reports feeling that after being on the unit he feels his problems are not so bad  Asking about College Unit
Pt reports he is sleeping and eating well.  Is disappointed he cannot go to college track unit but is still interested in college track outpatient clinic.  States he plans on returning to work and school.  No thoughts of harming self.  Feels he has exhausted benefits from 2N stay and is looking forward to discharge.
Pt continues to report improvement in sleep.  States he is looking to move forward in life but knows he has many things that remind him of past relationship that he would have to remove from his home, says that he needs to consider more time to learn better ways of coping before returning home.  States he has had difficulty relating to peers on unit as they are all so much older and have severe mental problems, asks about college unit.
pt seen for follow up of depression.    Reports sleeping for the first time in weeks.   Reports feeling that after being on the unit he feels his problems are not so bad  Asking about College Unit

## 2021-02-17 NOTE — BH DISCHARGE NOTE NURSING/SOCIAL WORK/PSYCH REHAB - NSDCPRRECOMMEND_PSY_ALL_CORE
Rehabilitation staff recommends patient continue to use healthy coping skills for symptom management and attend outpatient treatment for continuing support.

## 2021-02-17 NOTE — BH INPATIENT PSYCHIATRY PROGRESS NOTE - NSTXCOPEGOAL_PSY_ALL_CORE
Identify and utilize 2 coping skills that meet their needs

## 2021-02-17 NOTE — BH INPATIENT PSYCHIATRY PROGRESS NOTE - NSBHCHARTREVIEWVS_PSY_A_CORE FT
Vital Signs Last 24 Hrs  T(C): 36.2 (16 Feb 2021 07:28), Max: 36.4 (15 Feb 2021 19:33)  T(F): 97.2 (16 Feb 2021 07:28), Max: 97.6 (15 Feb 2021 19:33)  HR: --  BP: --  BP(mean): --  RR: --  SpO2: --
Vital Signs Last 24 Hrs  T(C): 36.9 (14 Feb 2021 08:09), Max: 36.9 (14 Feb 2021 08:09)  T(F): 98.5 (14 Feb 2021 08:09), Max: 98.5 (14 Feb 2021 08:09)  HR: --  BP: 124/71 (14 Feb 2021 08:09) (124/71 - 124/71)  BP(mean): 71 (14 Feb 2021 08:09) (71 - 71)  RR: --  SpO2: --
Vital Signs Last 24 Hrs  T(C): 36.3 (15 Feb 2021 08:20), Max: 36.6 (14 Feb 2021 17:08)  T(F): 97.3 (15 Feb 2021 08:20), Max: 97.9 (14 Feb 2021 17:08)  HR: --  BP: --  BP(mean): --  RR: --  SpO2: --
Vital Signs Last 24 Hrs  T(C): 36.3 (17 Feb 2021 08:14), Max: 36.8 (16 Feb 2021 17:34)  T(F): 97.4 (17 Feb 2021 08:14), Max: 98.2 (16 Feb 2021 17:34)  HR: --  BP: --  BP(mean): --  RR: --  SpO2: --

## 2021-02-17 NOTE — BH DISCHARGE NOTE NURSING/SOCIAL WORK/PSYCH REHAB - NSDCPRGOAL_PSY_ALL_CORE
Pt has made improvement in this hospitalization. Pt’s psych rehab goal was addressed. Pt’s psych rehab goal was identifying coping skills to better manage with sxs. Pt identified looking at his hospital bracelet as a coping skill to when he leaves the hospital. He explained that it will help him put things in perceptive. He also identified playing chess as a coping skill. Pt is future orientated and stating that he is looking forward to starting outpatient treatment for continuing support. During this hospitalization, pt adhered to medication. Pt demonstrated improvement in sxs. Pt denied SI/HI. Pt reported improvement in sleep. Pt was in good behavioral control. Pt was calm and cooperative with staff. Pt was visible and social on the unit. Pt attended some of psych rehab groups with encouragement.

## 2021-02-17 NOTE — BH INPATIENT PSYCHIATRY PROGRESS NOTE - PRN MEDS
MEDICATIONS  (PRN):  acetaminophen   Tablet .. 650 milliGRAM(s) Oral every 6 hours PRN Moderate Pain (4 - 6), Severe Pain (7 - 10)  LORazepam     Tablet 1 milliGRAM(s) Oral every 6 hours PRN anxiety  LORazepam     Tablet 2 milliGRAM(s) Oral every 2 hours PRN CIWA score increase by 2 points and current CIWA score GREATER THAN 9  LORazepam   Injectable 2 milliGRAM(s) IntraMuscular once PRN combativeness due to depression  
MEDICATIONS  (PRN):  acetaminophen   Tablet .. 650 milliGRAM(s) Oral every 6 hours PRN Moderate Pain (4 - 6), Severe Pain (7 - 10)  LORazepam     Tablet 2 milliGRAM(s) Oral every 2 hours PRN CIWA score increase by 2 points and current CIWA score GREATER THAN 9  LORazepam     Tablet 1 milliGRAM(s) Oral every 6 hours PRN anxiety  LORazepam   Injectable 2 milliGRAM(s) IntraMuscular once PRN combativeness due to depression

## 2021-02-17 NOTE — BH INPATIENT PSYCHIATRY PROGRESS NOTE - NSBHFUPINTERVALCCFT_PSY_A_CORE
"I am feeling better and sleeping better but was wondering about the college unit!!"
"I slept better for the first time in weeks... Thank you!!"
Depression
Depression

## 2021-02-17 NOTE — BH INPATIENT PSYCHIATRY DISCHARGE NOTE - HOSPITAL COURSE
Upon initial 2N evaluation, pt endorsed chronic depression, with recent worsening following loss of close friendship.  Treatment team became concerned for major depressive episode, but also persistent depressive disorder.  Pt likely also had component of adjustment to recent aforementioned interpersonal stressor, and there was also suspicion for personality traits vs disorder given history of chronic maladaptive coping response of intermittent suicidal ideations and provocative statements.  Pt was started on escitalopram 5 mg daily, but due to recent insomnia was started on mirtazapine 7.5 mg qhs, with subsequent subjective rapid improvement in symptoms.  Pt consistently denied suicidality and homicidality on unit.  He was visible, social with select peers, compliant with meds and basic care, and was independent in ADLs.  He sought transfer to  college track unit, however this was not possible on this admission, pt was motivated to continue care in [facility] clinic.  Given that pt was no longer an acute or imminent risk of harm to self or others, he was discharged home with outpatient follow up.

## 2021-02-17 NOTE — BH INPATIENT PSYCHIATRY PROGRESS NOTE - NSTXDEPRESGOAL_PSY_ALL_CORE
Will identify thoughts and self-talk that contribute to depression

## 2021-02-17 NOTE — BH INPATIENT PSYCHIATRY PROGRESS NOTE - NSICDXBHPRIMARYDX_PSY_ALL_CORE
MDD (major depressive disorder), severe   F32.2  

## 2021-02-17 NOTE — BH INPATIENT PSYCHIATRY PROGRESS NOTE - NSTXPROBDCOPLK_PSY_ALL_CORE
DISCHARGE ISSUE - LACK OF APPROPRIATE OUTPATIENT SERVICES
DISCHARGE ISSUE - LACK OF APPROPRIATE OUTPATIENT SERVICES

## 2021-02-17 NOTE — BH INPATIENT PSYCHIATRY PROGRESS NOTE - NSBHASSESSSUMMFT_PSY_ALL_CORE
Continue meds as ordered.  Transfer to 1S.  Handoff provided to Dr. Peace.  
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       PLAN    Patient requires acute inpatient care for treatment of depression.   Patient admitted on a voluntary status.    Patient does not require constant observation at this time and will follow with routine checks.   Patient with no prior treatment and will start SSRI trial for MDD  Lexapro at 5 mg and remeron at 7.5 mg HS and follow for response.   Treatment will include individual therapy/supportive therapy/ rehab therapy/ psychopharmacological therapy and milieu therapy  
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       PLAN    Patient requires acute inpatient care for treatment of depression.   Patient admitted on a voluntary status.    Patient does not require constant observation at this time and will follow with routine checks.   Patient with no prior treatment and will start SSRI trial for MDD  Lexapro at 5 mg and remeron at 7.5 mg HS and follow for response.   Treatment will include individual therapy/supportive therapy/ rehab therapy/ psychopharmacological therapy and milieu therapy  
Attenuated depression, no SIIP.  Suspect MDD with components of adjustment and possible personality traits.    Pt has chronic risk factors of cannabis/etoh use in past, chronic suicidality as maladaptive coping response, with acute risk factors of recent depression in context of falling out with friend, now treated with inpatient care.  Protective factors of stable housing, supportive family, is employed, in school, no access to firearms, medically healthy.  Pt is high CHRONIC risk of harm, but is NO longer an acute or imminent risk of harm to self or others, can be discharged with outpatient follow up.    1. Will d/c home on current regimen.  2. Psychoeducation provided regarding importance of compliance with outpatient appointments and medications.  3. Pt was advised to remain abstinent with substances.  4. Pt was advised to dial 911 or return to ER if they become danger to self or others.

## 2021-02-17 NOTE — BH INPATIENT PSYCHIATRY DISCHARGE NOTE - HPI (INCLUDE ILLNESS QUALITY, SEVERITY, DURATION, TIMING, CONTEXT, MODIFYING FACTORS, ASSOCIATED SIGNS AND SYMPTOMS)
Patient seen by me at length in conference room for initial inpatient interview.  I have reviewed the admission record and reviewed the patient's physical examination, review of systems and labs. I have discussed the case with the treatment team.       Patient states he has been depressed for years and only felt "normal" after using "Magic mushrooms".  Has been increasingly depressed but recently had a close female friend who ended their friendship and "betrayed" im though he would not elaborate. 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   Pt states he has been depressed for the past 2-3 years but has "bottled it up". He states he has always managed by reaching out to friends. He noticed over the past several days it worsened after the end of a friendship with a female, he describes her as his "confidant" but now she is no longer in his life. He states he has contemplated suicide many times but noted the thoughts increasing in intensity since this falling out with his friend. He states he often thinks he will be better off dead, because he might get "a restart", but since he is not sure he will get a restart, he has not gone through with killing himself. He states "it's easy" when asked about a suicide method, stating he could cut wrists, jump off a building, saying "I watch tv, I know how to do it" but denies coming up with a concrete plan, denies researching methods of suicide, denies procuring objects to harm himself with or taking other preparatory actions. Denies access to a gun.  He states he "feels nothing" and is "empty". He is not motivated or interested in school, saying virtual school has been challenging for him and as a result he is currently on academic probation. He denies concentration difficulties. He states his sleep is chronically poor (he often works the night shift but occasionally takes an evening shift) and he is tired. He has difficulty falling asleep even after working an overnight shift. He reports no change in appetite. He feels guilt for this issue with his relationship. Has also had self injurious thoughts but denies ever engaging in behaviors. He denies homicidal ideation. He denies paranoia, denies hallucinations, denies IOR. Denies ever experiencing manic symptoms. He reports anxiety and tenseness but denies panic attacks. He states in the past he experimented with hallucinogens (shrooms and lsd) after researching its potential therapeutic benefits on depression. He states he smokes weed occasionally and drinks socially. He states he likes being drunk but rarely gets to that point, last drink was last night, denies blackouts/withdrawal. He denies ever being physically violent but states he punched a wall once. He is requesting admission.    See  note for information from father As per initial ED Behavioral Assessment Note filed on 2/17/2021 by Dr. 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     Pt states he has been depressed for the past 2-3 years but has "bottled it up". He states he has always managed by reaching out to friends. He noticed over the past several days it worsened after the end of a friendship with a female, he describes her as his "confidant" but now she is no longer in his life. He states he has contemplated suicide many times but noted the thoughts increasing in intensity since this falling out with his friend. He states he often thinks he will be better off dead, because he might get "a restart", but since he is not sure he will get a restart, he has not gone through with killing himself. He states "it's easy" when asked about a suicide method, stating he could cut wrists, jump off a building, saying "I watch tv, I know how to do it" but denies coming up with a concrete plan, denies researching methods of suicide, denies procuring objects to harm himself with or taking other preparatory actions. Denies access to a gun.  He states he "feels nothing" and is "empty". He is not motivated or interested in school, saying virtual school has been challenging for him and as a result he is currently on academic probation. He denies concentration difficulties. He states his sleep is chronically poor (he often works the night shift but occasionally takes an evening shift) and he is tired. He has difficulty falling asleep even after working an overnight shift. He reports no change in appetite. He feels guilt for this issue with his relationship. Has also had self injurious thoughts but denies ever engaging in behaviors. He denies homicidal ideation. He denies paranoia, denies hallucinations, denies IOR. Denies ever experiencing manic symptoms. He reports anxiety and tenseness but denies panic attacks. He states in the past he experimented with hallucinogens (shrooms and lsd) after researching its potential therapeutic benefits on depression. He states he smokes weed occasionally and drinks socially. He states he likes being drunk but rarely gets to that point, last drink was last night, denies blackouts/withdrawal. He denies ever being physically violent but states he punched a wall once. He is requesting admission."    As per ED Behavioral Health Note filed on 2/12/2021 by KIAH Martinez:    "Writer called [phone] spoke to pt's father Zhane Joseph.  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  Pt last worked one week ago.  He states he brought pt to the ER because pt was upset.  He states pt gets angry very fast then he wants to do things to himself.  He states in the past (about one year ago) pt used to talk about suicide.  He states today pt said he was fed up with life. He states he never brought patient to the emergency room because he didn't think it was serious.  He states now pt seems to be in an angry mood when he asks pt to go to the grocery store, pt is more withdrawn.  He states in the past patient used to run errands with him.  Pt's father doesn't know if patient should go to the hospital at this time, he thinks pt should just get treatment for one night and then go home.  He states patient is having a hard time sleeping, pt is up at 2 and 3 am.  Pt's room is in the attic and states he can hear patient walking around.  He believes pt is eating normally.  He states one month ago pt punched a wall, but nothing dangerous.  Denies pt uses any drugs or alcohol."

## 2021-02-17 NOTE — BH INPATIENT PSYCHIATRY DISCHARGE NOTE - DESCRIPTION
parents are , lives with father, rest of family is in [place]. Student at [employer] and employed at [employer] in a [employer]

## 2021-02-22 ENCOUNTER — OUTPATIENT (OUTPATIENT)
Dept: OUTPATIENT SERVICES | Facility: HOSPITAL | Age: 21
LOS: 1 days | Discharge: HOME | End: 2021-02-22
Payer: COMMERCIAL

## 2021-02-23 PROCEDURE — 90792 PSYCH DIAG EVAL W/MED SRVCS: CPT | Mod: 95

## 2021-03-16 PROCEDURE — 99214 OFFICE O/P EST MOD 30 MIN: CPT | Mod: 95

## 2021-03-25 DIAGNOSIS — F32.9 MAJOR DEPRESSIVE DISORDER, SINGLE EPISODE, UNSPECIFIED: ICD-10-CM

## 2021-04-20 PROCEDURE — 99214 OFFICE O/P EST MOD 30 MIN: CPT | Mod: 95

## 2021-05-18 PROCEDURE — 99214 OFFICE O/P EST MOD 30 MIN: CPT | Mod: 95

## 2022-01-31 NOTE — BH SAFETY PLAN - STEP 5 CRISIS
Incoming from pt. Two patient Identifiers confirmed. Advised pt per Dr Susi Perez. Issue resolved in hospital per pt. No other issues noted. .

## 2022-02-19 NOTE — ED PROVIDER NOTE - CPE EDP HEME LYMPH NORM
Patient arrived to [facility] from PACU at 1630. Oral gauze saturated with blood. Changed by PACU RN. MD aware. Patient complaining of 8/10 oral pain. Notified Dr. Mumtaz FRYE at bedside at 1730 to assess bleeding. MD ordered PRN IV dilaudid Q2H and compression wrap to jaw to help with bleeding.    normal...

## 2024-05-31 NOTE — BH INPATIENT PSYCHIATRY PROGRESS NOTE - NSTXDCOPLKGOAL_PSY_ALL_CORE
2 pair
Will agree to consider an appropriate level of outpatient care
Will agree to consider an appropriate level of outpatient care
